# Patient Record
Sex: FEMALE | URBAN - METROPOLITAN AREA
[De-identification: names, ages, dates, MRNs, and addresses within clinical notes are randomized per-mention and may not be internally consistent; named-entity substitution may affect disease eponyms.]

---

## 2021-12-20 ENCOUNTER — EMERGENCY (EMERGENCY)
Facility: HOSPITAL | Age: 46
LOS: 1 days | Discharge: HOME | End: 2021-12-20
Admitting: EMERGENCY MEDICINE
Payer: COMMERCIAL

## 2021-12-20 VITALS
SYSTOLIC BLOOD PRESSURE: 124 MMHG | TEMPERATURE: 98 F | OXYGEN SATURATION: 99 % | HEART RATE: 84 BPM | DIASTOLIC BLOOD PRESSURE: 81 MMHG | RESPIRATION RATE: 18 BRPM

## 2021-12-20 VITALS
SYSTOLIC BLOOD PRESSURE: 122 MMHG | OXYGEN SATURATION: 98 % | RESPIRATION RATE: 16 BRPM | TEMPERATURE: 98 F | DIASTOLIC BLOOD PRESSURE: 76 MMHG | HEART RATE: 78 BPM
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
## 2021-12-20 DIAGNOSIS — S91.114A LACERATION WITHOUT FOREIGN BODY OF RIGHT LESSER TOE(S) WITHOUT DAMAGE TO NAIL, INITIAL ENCOUNTER: ICD-10-CM

## 2021-12-20 PROCEDURE — 12001 RPR S/N/AX/GEN/TRNK 2.5CM/<: CPT

## 2021-12-20 PROCEDURE — 99284 EMERGENCY DEPT VISIT MOD MDM: CPT | Mod: 25

## 2021-12-20 PROCEDURE — 73660 X-RAY EXAM OF TOE(S): CPT | Mod: 26,RT

## 2021-12-20 RX ORDER — OXYCODONE AND ACETAMINOPHEN 5; 325 MG/1; MG/1
1 TABLET ORAL ONCE
Refills: 0 | Status: DISCONTINUED | OUTPATIENT
Start: 2021-12-20 | End: 2021-12-20

## 2021-12-20 RX ADMIN — Medication 1 TABLET(S): at 14:02

## 2021-12-20 RX ADMIN — OXYCODONE AND ACETAMINOPHEN 1 TABLET(S): 5; 325 TABLET ORAL at 11:58

## 2021-12-20 NOTE — ED PROVIDER NOTE - CLINICAL SUMMARY MEDICAL DECISION MAKING FREE TEXT BOX
right 2nd toe laceration right 2nd toe open fracture, wound copiously irrigated, closed, tetanus utd, rx augmentin, otc pain meds, wound care discussed, discussed importance of close follow up with pmd/podiatry upon going home ([place]) as high chance of infection, ED return precautions discussed, will dc with friend.

## 2021-12-20 NOTE — ED ADULT TRIAGE NOTE - CHIEF COMPLAINT QUOTE
pt biba from hotel for pain and laceration R 2nd toe s/p misstep on floor covering. Tetanus up to date.

## 2021-12-20 NOTE — ED PROVIDER NOTE - NSFOLLOWUPINSTRUCTIONS_ED_ALL_ED_FT
Laceration    A laceration is a cut that goes through all of the layers of the skin and into the tissue that is right under the skin. Some lacerations heal on their own. Others need to be closed with skin adhesive strips, skin glue, stitches (sutures), or staples. Proper laceration care minimizes the risk of infection and helps the laceration to heal better.  If non-absorbable stitches or staples have been placed, they must be taken out within the time frame instructed by your healthcare provider.    Take antibiotics as prescribed  Take Ibuprofen 600mg every 6-8 hours as needed for pain, take with food, and in addition you may take Tylenol 500 mg every 6-8 hours as needed for pain  Rest.  Extremity elevation.    Follow up with podiatry    SEEK IMMEDIATE MEDICAL CARE IF YOU HAVE ANY OF THE FOLLOWING SYMPTOMS: swelling around the wound, worsening pain, drainage from the wound, red streaking going away from your wound, inability to move finger or toe near the laceration, or discoloration of skin near the laceration.

## 2021-12-20 NOTE — ED PROVIDER NOTE - PHYSICAL EXAMINATION
CONSTITUTIONAL: Well-appearing; well-nourished; in no apparent distress.   	HEAD: Normocephalic; atraumatic.   	RLE: 2nd toe plantar aspect laceration superficial at IP midphalanx. no active bleeding or fb, good cap refill. no visible bone.   	NEURO: A & O x 3; face symmetric; grossly unremarkable.   PSYCHOLOGICAL: The patient’s mood and manner are appropriate.

## 2021-12-20 NOTE — ED PROVIDER NOTE - OBJECTIVE STATEMENT
47 yo female [place] presents c/o right 2nd toe laceration sustained while going up stairs in hotel, stubbed hoe prior to ED arrival. no numbness or weakness. tetanus utd. not diabetic.

## 2021-12-20 NOTE — ED PROVIDER NOTE - PATIENT PORTAL LINK FT
You can access the FollowMyHealth Patient Portal offered by [facility] by registering at the following website: [email or web address]. By joining [employer]’s FollowMyHealth portal, you will also be able to view your health information using other applications (apps) compatible with our system.

## 2022-01-15 NOTE — ED PROCEDURE NOTE - NS_EDPROVIDERDISPOUSERTYPE_ED_A_ED
I have personally evaluated and examined the patient. The Attending was available to me as a supervising provider if needed.
(4) History of Falls or Infant-Toddler Placed in Bed

## 2022-10-18 NOTE — ED PROVIDER NOTE - CROS ED [facility] ALL NEG
----- Message from Mauri Vital MD sent at 7/27/2022  4:48 PM EDT -----  Please request medical records from the following:    Previous PCPs: Dr. Powell, Dr. Samuel Frey, Dr. Aniyah Loco  Specialists: [facility]    In addition, please request imaging records from [facility]    
Comments    3RD - 11/18/22 REFAXED MEDICAL RECORDS REQUEST FOR  DR MAU FARR    [facility]IST AND CHAVES AND BLOOM IN THE CHART/VL       2ND - 10/18/22  REFAXED MEDICAL RECORDS REQUEST TO :    -DR. RAFFY FARR- [phone]    -[facility]- [phone]    -[facility]- [phone]        1ST 7/28/22 FAXED MEDICAL RECORDS REQUEST TO :        -DR. RAFFY FARR- [phone]    -[facility]- [phone]    -[facility]- [phone]    /rt        9/13/22 RECEIVED     -DR. ADRIENNE VALDEZ-[phone]        8/9/22  RECEIVED MEDICAL RECORDS FROM DR. MARIANO CARTER-rt-   DR. CARTER,[facility] INTERNAL MEDIC[facility]- [phone]        DR FARR OFFICE CALLED 8/1/22 AND ARE NEEDING A SIGNED RECORD RELEASE FORM IN ORDER TO PROVIDER MEDICAL RECORDS.  SENT FORM TO PATIENT TO PLEASE RETURN./VL        
- - -